# Patient Record
Sex: FEMALE | Race: BLACK OR AFRICAN AMERICAN | NOT HISPANIC OR LATINO
[De-identification: names, ages, dates, MRNs, and addresses within clinical notes are randomized per-mention and may not be internally consistent; named-entity substitution may affect disease eponyms.]

---

## 2018-03-09 ENCOUNTER — APPOINTMENT (OUTPATIENT)
Dept: PSYCHIATRY | Facility: CLINIC | Age: 33
End: 2018-03-09
Payer: OTHER GOVERNMENT

## 2018-03-16 ENCOUNTER — APPOINTMENT (OUTPATIENT)
Dept: PSYCHIATRY | Facility: CLINIC | Age: 33
End: 2018-03-16
Payer: OTHER GOVERNMENT

## 2018-03-16 PROCEDURE — 90791 PSYCH DIAGNOSTIC EVALUATION: CPT

## 2018-03-23 ENCOUNTER — APPOINTMENT (OUTPATIENT)
Dept: PSYCHIATRY | Facility: CLINIC | Age: 33
End: 2018-03-23
Payer: OTHER GOVERNMENT

## 2018-03-23 PROCEDURE — 90837 PSYTX W PT 60 MINUTES: CPT

## 2018-03-30 ENCOUNTER — APPOINTMENT (OUTPATIENT)
Dept: PSYCHIATRY | Facility: CLINIC | Age: 33
End: 2018-03-30

## 2018-04-20 ENCOUNTER — APPOINTMENT (OUTPATIENT)
Dept: PSYCHIATRY | Facility: CLINIC | Age: 33
End: 2018-04-20
Payer: OTHER GOVERNMENT

## 2018-04-20 PROCEDURE — 90834 PSYTX W PT 45 MINUTES: CPT

## 2018-05-04 ENCOUNTER — APPOINTMENT (OUTPATIENT)
Dept: PSYCHIATRY | Facility: CLINIC | Age: 33
End: 2018-05-04

## 2018-05-11 ENCOUNTER — APPOINTMENT (OUTPATIENT)
Dept: PSYCHIATRY | Facility: CLINIC | Age: 33
End: 2018-05-11
Payer: OTHER GOVERNMENT

## 2018-05-11 PROCEDURE — 90834 PSYTX W PT 45 MINUTES: CPT

## 2018-05-18 ENCOUNTER — APPOINTMENT (OUTPATIENT)
Dept: PSYCHIATRY | Facility: CLINIC | Age: 33
End: 2018-05-18
Payer: OTHER GOVERNMENT

## 2018-05-18 DIAGNOSIS — F43.21 ADJUSTMENT DISORDER WITH DEPRESSED MOOD: ICD-10-CM

## 2018-05-18 PROCEDURE — 90837 PSYTX W PT 60 MINUTES: CPT

## 2018-06-15 ENCOUNTER — APPOINTMENT (OUTPATIENT)
Dept: PSYCHIATRY | Facility: CLINIC | Age: 33
End: 2018-06-15

## 2018-08-15 ENCOUNTER — EMERGENCY (EMERGENCY)
Facility: HOSPITAL | Age: 33
LOS: 1 days | Discharge: HOME | End: 2018-08-15
Attending: EMERGENCY MEDICINE | Admitting: EMERGENCY MEDICINE
Payer: OTHER GOVERNMENT

## 2018-08-15 VITALS
SYSTOLIC BLOOD PRESSURE: 147 MMHG | OXYGEN SATURATION: 99 % | DIASTOLIC BLOOD PRESSURE: 99 MMHG | RESPIRATION RATE: 16 BRPM | HEART RATE: 82 BPM

## 2018-08-15 VITALS
TEMPERATURE: 99 F | DIASTOLIC BLOOD PRESSURE: 103 MMHG | SYSTOLIC BLOOD PRESSURE: 137 MMHG | RESPIRATION RATE: 17 BRPM | HEART RATE: 80 BPM | OXYGEN SATURATION: 100 %

## 2018-08-15 LAB
ALBUMIN SERPL ELPH-MCNC: 4 G/DL — SIGNIFICANT CHANGE UP (ref 3.3–5)
ALP SERPL-CCNC: 61 U/L — SIGNIFICANT CHANGE UP (ref 40–120)
ALT FLD-CCNC: 10 U/L — SIGNIFICANT CHANGE UP (ref 4–33)
AST SERPL-CCNC: 13 U/L — SIGNIFICANT CHANGE UP (ref 4–32)
BASOPHILS # BLD AUTO: 0.02 K/UL — SIGNIFICANT CHANGE UP (ref 0–0.2)
BASOPHILS NFR BLD AUTO: 0.3 % — SIGNIFICANT CHANGE UP (ref 0–2)
BILIRUB SERPL-MCNC: 0.3 MG/DL — SIGNIFICANT CHANGE UP (ref 0.2–1.2)
BUN SERPL-MCNC: 12 MG/DL — SIGNIFICANT CHANGE UP (ref 7–23)
CALCIUM SERPL-MCNC: 9.3 MG/DL — SIGNIFICANT CHANGE UP (ref 8.4–10.5)
CHLORIDE SERPL-SCNC: 101 MMOL/L — SIGNIFICANT CHANGE UP (ref 98–107)
CO2 SERPL-SCNC: 25 MMOL/L — SIGNIFICANT CHANGE UP (ref 22–31)
CREAT SERPL-MCNC: 0.93 MG/DL — SIGNIFICANT CHANGE UP (ref 0.5–1.3)
D DIMER BLD IA.RAPID-MCNC: < 150 NG/ML — SIGNIFICANT CHANGE UP
EOSINOPHIL # BLD AUTO: 0.07 K/UL — SIGNIFICANT CHANGE UP (ref 0–0.5)
EOSINOPHIL NFR BLD AUTO: 1.1 % — SIGNIFICANT CHANGE UP (ref 0–6)
GLUCOSE SERPL-MCNC: 102 MG/DL — HIGH (ref 70–99)
HCT VFR BLD CALC: 36.6 % — SIGNIFICANT CHANGE UP (ref 34.5–45)
HGB BLD-MCNC: 11.6 G/DL — SIGNIFICANT CHANGE UP (ref 11.5–15.5)
IMM GRANULOCYTES # BLD AUTO: 0.02 # — SIGNIFICANT CHANGE UP
IMM GRANULOCYTES NFR BLD AUTO: 0.3 % — SIGNIFICANT CHANGE UP (ref 0–1.5)
LYMPHOCYTES # BLD AUTO: 2.22 K/UL — SIGNIFICANT CHANGE UP (ref 1–3.3)
LYMPHOCYTES # BLD AUTO: 35.5 % — SIGNIFICANT CHANGE UP (ref 13–44)
MCHC RBC-ENTMCNC: 28 PG — SIGNIFICANT CHANGE UP (ref 27–34)
MCHC RBC-ENTMCNC: 31.7 % — LOW (ref 32–36)
MCV RBC AUTO: 88.4 FL — SIGNIFICANT CHANGE UP (ref 80–100)
MONOCYTES # BLD AUTO: 0.54 K/UL — SIGNIFICANT CHANGE UP (ref 0–0.9)
MONOCYTES NFR BLD AUTO: 8.6 % — SIGNIFICANT CHANGE UP (ref 2–14)
NEUTROPHILS # BLD AUTO: 3.39 K/UL — SIGNIFICANT CHANGE UP (ref 1.8–7.4)
NEUTROPHILS NFR BLD AUTO: 54.2 % — SIGNIFICANT CHANGE UP (ref 43–77)
NRBC # FLD: 0 — SIGNIFICANT CHANGE UP
PLATELET # BLD AUTO: 241 K/UL — SIGNIFICANT CHANGE UP (ref 150–400)
PMV BLD: 10.7 FL — SIGNIFICANT CHANGE UP (ref 7–13)
POTASSIUM SERPL-MCNC: 3.8 MMOL/L — SIGNIFICANT CHANGE UP (ref 3.5–5.3)
POTASSIUM SERPL-SCNC: 3.8 MMOL/L — SIGNIFICANT CHANGE UP (ref 3.5–5.3)
PROT SERPL-MCNC: 7.1 G/DL — SIGNIFICANT CHANGE UP (ref 6–8.3)
RBC # BLD: 4.14 M/UL — SIGNIFICANT CHANGE UP (ref 3.8–5.2)
RBC # FLD: 13.6 % — SIGNIFICANT CHANGE UP (ref 10.3–14.5)
SODIUM SERPL-SCNC: 138 MMOL/L — SIGNIFICANT CHANGE UP (ref 135–145)
TROPONIN T, HIGH SENSITIVITY: < 6 NG/L — SIGNIFICANT CHANGE UP (ref ?–14)
WBC # BLD: 6.26 K/UL — SIGNIFICANT CHANGE UP (ref 3.8–10.5)
WBC # FLD AUTO: 6.26 K/UL — SIGNIFICANT CHANGE UP (ref 3.8–10.5)

## 2018-08-15 RX ORDER — KETOROLAC TROMETHAMINE 30 MG/ML
15 SYRINGE (ML) INJECTION ONCE
Qty: 0 | Refills: 0 | Status: DISCONTINUED | OUTPATIENT
Start: 2018-08-15 | End: 2018-08-15

## 2018-08-15 RX ADMIN — Medication 15 MILLIGRAM(S): at 04:43

## 2018-08-15 RX ADMIN — Medication 15 MILLIGRAM(S): at 05:32

## 2018-08-15 NOTE — ED PROVIDER NOTE - OBJECTIVE STATEMENT
32y/o F with PMHx of HTN, Hypothyroidism, presents to the ED with mid-sternal chest pain and SOB tonight, preventing her from sleeping. Her symptoms worsen with lying down, improve with sitting up. Pt had a 14hr drive to [place] 1.5w ago, but took breaks. Denies fevers/chills, cough, recent URI, leg pain, leg swelling, PMHx of DVT or PE, any other complaints. NKDA.

## 2018-08-15 NOTE — ED ADULT NURSE REASSESSMENT NOTE - NS ED NURSE REASSESS COMMENT FT1
Pt did st" When ready to discharge will call my  for ." Pt did appear comfortable following torodol...noted to be asleep...conversation regarding bp with pt...pt st"I have htn and thyroid dx, my meds are in the car I usually take them in am before going to the gym."

## 2018-08-15 NOTE — ED ADULT TRIAGE NOTE - CHIEF COMPLAINT QUOTE
alert c/o SOB and chest pain x 2 days    drove back and forth to [place] 10 days ago  hx HTN and hypothyroid

## 2018-08-15 NOTE — ED ADULT NURSE NOTE - NSIMPLEMENTINTERV_GEN_ALL_ED
Implemented All Universal Safety Interventions:  [place] to call system. Call bell, personal items and telephone within reach. Instruct patient to call for assistance. Room bathroom lighting operational. Non-slip footwear when patient is off stretcher. Physically safe environment: no spills, clutter or unnecessary equipment. Stretcher in lowest position, wheels locked, appropriate side rails in place.

## 2018-08-15 NOTE — ED PROVIDER NOTE - MEDICAL DECISION MAKING DETAILS
34y/o F with chest pain and SOB. Will get dimer to r/o PE given recent travel. EKG normal. Will get 1 troponin, although story not consistent with ACS.

## 2018-08-23 ENCOUNTER — EMERGENCY (EMERGENCY)
Facility: HOSPITAL | Age: 33
LOS: 1 days | Discharge: HOME | End: 2018-08-23
Attending: EMERGENCY MEDICINE
Payer: OTHER GOVERNMENT

## 2018-08-23 VITALS
RESPIRATION RATE: 16 BRPM | DIASTOLIC BLOOD PRESSURE: 76 MMHG | SYSTOLIC BLOOD PRESSURE: 117 MMHG | OXYGEN SATURATION: 100 % | TEMPERATURE: 98 F | HEART RATE: 78 BPM

## 2018-08-23 VITALS
RESPIRATION RATE: 18 BRPM | HEART RATE: 71 BPM | TEMPERATURE: 98 F | HEIGHT: 66 IN | SYSTOLIC BLOOD PRESSURE: 133 MMHG | OXYGEN SATURATION: 98 % | WEIGHT: 210.1 LBS | DIASTOLIC BLOOD PRESSURE: 85 MMHG

## 2018-08-23 DIAGNOSIS — R10.31 RIGHT LOWER QUADRANT PAIN: ICD-10-CM

## 2018-08-23 PROBLEM — E03.9 HYPOTHYROIDISM, UNSPECIFIED: Chronic | Status: ACTIVE | Noted: 2018-08-21

## 2018-08-23 PROBLEM — I10 ESSENTIAL (PRIMARY) HYPERTENSION: Chronic | Status: ACTIVE | Noted: 2018-08-21

## 2018-08-23 LAB
ALBUMIN SERPL ELPH-MCNC: 4.1 G/DL — SIGNIFICANT CHANGE UP (ref 3.3–5)
ALP SERPL-CCNC: 44 U/L — SIGNIFICANT CHANGE UP (ref 40–120)
ALT FLD-CCNC: 13 U/L — SIGNIFICANT CHANGE UP (ref 10–45)
ANION GAP SERPL CALC-SCNC: 10 MMOL/L — SIGNIFICANT CHANGE UP (ref 5–17)
APPEARANCE UR: CLEAR — SIGNIFICANT CHANGE UP
APTT BLD: 27 SEC — LOW (ref 27.5–37.4)
AST SERPL-CCNC: 17 U/L — SIGNIFICANT CHANGE UP (ref 10–40)
BASOPHILS # BLD AUTO: 0.1 K/UL — SIGNIFICANT CHANGE UP (ref 0–0.2)
BASOPHILS NFR BLD AUTO: 1.3 % — SIGNIFICANT CHANGE UP (ref 0–2)
BILIRUB SERPL-MCNC: 0.6 MG/DL — SIGNIFICANT CHANGE UP (ref 0.2–1.2)
BILIRUB UR-MCNC: NEGATIVE — SIGNIFICANT CHANGE UP
BLD GP AB SCN SERPL QL: NEGATIVE — SIGNIFICANT CHANGE UP
BUN SERPL-MCNC: 13 MG/DL — SIGNIFICANT CHANGE UP (ref 7–23)
CALCIUM SERPL-MCNC: 9.3 MG/DL — SIGNIFICANT CHANGE UP (ref 8.4–10.5)
CHLORIDE SERPL-SCNC: 102 MMOL/L — SIGNIFICANT CHANGE UP (ref 96–108)
CO2 SERPL-SCNC: 23 MMOL/L — SIGNIFICANT CHANGE UP (ref 22–31)
COLOR SPEC: YELLOW — SIGNIFICANT CHANGE UP
CREAT SERPL-MCNC: 0.97 MG/DL — SIGNIFICANT CHANGE UP (ref 0.5–1.3)
DIFF PNL FLD: ABNORMAL
EOSINOPHIL # BLD AUTO: 0.1 K/UL — SIGNIFICANT CHANGE UP (ref 0–0.5)
EOSINOPHIL NFR BLD AUTO: 1.7 % — SIGNIFICANT CHANGE UP (ref 0–6)
EPI CELLS # UR: SIGNIFICANT CHANGE UP /HPF
GAS PNL BLDV: SIGNIFICANT CHANGE UP
GLUCOSE SERPL-MCNC: 89 MG/DL — SIGNIFICANT CHANGE UP (ref 70–99)
GLUCOSE UR QL: NEGATIVE — SIGNIFICANT CHANGE UP
HCG UR QL: NEGATIVE — SIGNIFICANT CHANGE UP
HCT VFR BLD CALC: 37.2 % — SIGNIFICANT CHANGE UP (ref 34.5–45)
HGB BLD-MCNC: 12.3 G/DL — SIGNIFICANT CHANGE UP (ref 11.5–15.5)
INR BLD: 1.08 RATIO — SIGNIFICANT CHANGE UP (ref 0.88–1.16)
KETONES UR-MCNC: NEGATIVE — SIGNIFICANT CHANGE UP
LEUKOCYTE ESTERASE UR-ACNC: ABNORMAL
LYMPHOCYTES # BLD AUTO: 2.2 K/UL — SIGNIFICANT CHANGE UP (ref 1–3.3)
LYMPHOCYTES # BLD AUTO: 37.5 % — SIGNIFICANT CHANGE UP (ref 13–44)
MCHC RBC-ENTMCNC: 28.8 PG — SIGNIFICANT CHANGE UP (ref 27–34)
MCHC RBC-ENTMCNC: 33.1 GM/DL — SIGNIFICANT CHANGE UP (ref 32–36)
MCV RBC AUTO: 87 FL — SIGNIFICANT CHANGE UP (ref 80–100)
MONOCYTES # BLD AUTO: 0.5 K/UL — SIGNIFICANT CHANGE UP (ref 0–0.9)
MONOCYTES NFR BLD AUTO: 7.9 % — SIGNIFICANT CHANGE UP (ref 2–14)
NEUTROPHILS # BLD AUTO: 3.1 K/UL — SIGNIFICANT CHANGE UP (ref 1.8–7.4)
NEUTROPHILS NFR BLD AUTO: 51.7 % — SIGNIFICANT CHANGE UP (ref 43–77)
NITRITE UR-MCNC: NEGATIVE — SIGNIFICANT CHANGE UP
PH UR: 5.5 — SIGNIFICANT CHANGE UP (ref 5–8)
PLATELET # BLD AUTO: 249 K/UL — SIGNIFICANT CHANGE UP (ref 150–400)
POTASSIUM SERPL-MCNC: 3.6 MMOL/L — SIGNIFICANT CHANGE UP (ref 3.5–5.3)
POTASSIUM SERPL-SCNC: 3.6 MMOL/L — SIGNIFICANT CHANGE UP (ref 3.5–5.3)
PROT SERPL-MCNC: 7.4 G/DL — SIGNIFICANT CHANGE UP (ref 6–8.3)
PROT UR-MCNC: 30 MG/DL
PROTHROM AB SERPL-ACNC: 11.7 SEC — SIGNIFICANT CHANGE UP (ref 9.8–12.7)
RBC # BLD: 4.28 M/UL — SIGNIFICANT CHANGE UP (ref 3.8–5.2)
RBC # FLD: 13.4 % — SIGNIFICANT CHANGE UP (ref 10.3–14.5)
RBC CASTS # UR COMP ASSIST: ABNORMAL /HPF (ref 0–2)
RH IG SCN BLD-IMP: POSITIVE — SIGNIFICANT CHANGE UP
SODIUM SERPL-SCNC: 135 MMOL/L — SIGNIFICANT CHANGE UP (ref 135–145)
SP GR SPEC: 1.03 — HIGH (ref 1.01–1.02)
UROBILINOGEN FLD QL: NEGATIVE — SIGNIFICANT CHANGE UP
WBC # BLD: 6 K/UL — SIGNIFICANT CHANGE UP (ref 3.8–10.5)
WBC # FLD AUTO: 6 K/UL — SIGNIFICANT CHANGE UP (ref 3.8–10.5)
WBC UR QL: SIGNIFICANT CHANGE UP /HPF (ref 0–5)

## 2018-08-23 PROCEDURE — 82803 BLOOD GASES ANY COMBINATION: CPT

## 2018-08-23 PROCEDURE — 74177 CT ABD & PELVIS W/CONTRAST: CPT

## 2018-08-23 PROCEDURE — 81001 URINALYSIS AUTO W/SCOPE: CPT

## 2018-08-23 PROCEDURE — 85014 HEMATOCRIT: CPT

## 2018-08-23 PROCEDURE — 85027 COMPLETE CBC AUTOMATED: CPT

## 2018-08-23 PROCEDURE — 82330 ASSAY OF CALCIUM: CPT

## 2018-08-23 PROCEDURE — 86901 BLOOD TYPING SEROLOGIC RH(D): CPT

## 2018-08-23 PROCEDURE — 99284 EMERGENCY DEPT VISIT MOD MDM: CPT | Mod: 25

## 2018-08-23 PROCEDURE — 86900 BLOOD TYPING SEROLOGIC ABO: CPT

## 2018-08-23 PROCEDURE — 86850 RBC ANTIBODY SCREEN: CPT

## 2018-08-23 PROCEDURE — 76830 TRANSVAGINAL US NON-OB: CPT

## 2018-08-23 PROCEDURE — 87086 URINE CULTURE/COLONY COUNT: CPT

## 2018-08-23 PROCEDURE — 96376 TX/PRO/DX INJ SAME DRUG ADON: CPT

## 2018-08-23 PROCEDURE — 96374 THER/PROPH/DIAG INJ IV PUSH: CPT | Mod: XU

## 2018-08-23 PROCEDURE — 83605 ASSAY OF LACTIC ACID: CPT

## 2018-08-23 PROCEDURE — 84295 ASSAY OF SERUM SODIUM: CPT

## 2018-08-23 PROCEDURE — 93975 VASCULAR STUDY: CPT | Mod: 26

## 2018-08-23 PROCEDURE — 96375 TX/PRO/DX INJ NEW DRUG ADDON: CPT

## 2018-08-23 PROCEDURE — 99283 EMERGENCY DEPT VISIT LOW MDM: CPT | Mod: GC

## 2018-08-23 PROCEDURE — 82947 ASSAY GLUCOSE BLOOD QUANT: CPT

## 2018-08-23 PROCEDURE — 93975 VASCULAR STUDY: CPT

## 2018-08-23 PROCEDURE — 99285 EMERGENCY DEPT VISIT HI MDM: CPT

## 2018-08-23 PROCEDURE — 74177 CT ABD & PELVIS W/CONTRAST: CPT | Mod: 26

## 2018-08-23 PROCEDURE — 85730 THROMBOPLASTIN TIME PARTIAL: CPT

## 2018-08-23 PROCEDURE — 85610 PROTHROMBIN TIME: CPT

## 2018-08-23 PROCEDURE — 80053 COMPREHEN METABOLIC PANEL: CPT

## 2018-08-23 PROCEDURE — 82435 ASSAY OF BLOOD CHLORIDE: CPT

## 2018-08-23 PROCEDURE — 81025 URINE PREGNANCY TEST: CPT

## 2018-08-23 PROCEDURE — 76830 TRANSVAGINAL US NON-OB: CPT | Mod: 26

## 2018-08-23 PROCEDURE — 84132 ASSAY OF SERUM POTASSIUM: CPT

## 2018-08-23 RX ORDER — KETOROLAC TROMETHAMINE 30 MG/ML
30 SYRINGE (ML) INJECTION ONCE
Qty: 0 | Refills: 0 | Status: DISCONTINUED | OUTPATIENT
Start: 2018-08-23 | End: 2018-08-23

## 2018-08-23 RX ORDER — SODIUM CHLORIDE 9 MG/ML
1000 INJECTION INTRAMUSCULAR; INTRAVENOUS; SUBCUTANEOUS ONCE
Qty: 0 | Refills: 0 | Status: COMPLETED | OUTPATIENT
Start: 2018-08-23 | End: 2018-08-23

## 2018-08-23 RX ORDER — MORPHINE SULFATE 50 MG/1
4 CAPSULE, EXTENDED RELEASE ORAL ONCE
Qty: 0 | Refills: 0 | Status: DISCONTINUED | OUTPATIENT
Start: 2018-08-23 | End: 2018-08-23

## 2018-08-23 RX ADMIN — MORPHINE SULFATE 4 MILLIGRAM(S): 50 CAPSULE, EXTENDED RELEASE ORAL at 10:46

## 2018-08-23 RX ADMIN — MORPHINE SULFATE 4 MILLIGRAM(S): 50 CAPSULE, EXTENDED RELEASE ORAL at 10:16

## 2018-08-23 RX ADMIN — SODIUM CHLORIDE 1000 MILLILITER(S): 9 INJECTION INTRAMUSCULAR; INTRAVENOUS; SUBCUTANEOUS at 10:46

## 2018-08-23 RX ADMIN — MORPHINE SULFATE 4 MILLIGRAM(S): 50 CAPSULE, EXTENDED RELEASE ORAL at 15:59

## 2018-08-23 RX ADMIN — Medication 30 MILLIGRAM(S): at 13:17

## 2018-08-23 RX ADMIN — Medication 30 MILLIGRAM(S): at 13:47

## 2018-08-23 RX ADMIN — MORPHINE SULFATE 4 MILLIGRAM(S): 50 CAPSULE, EXTENDED RELEASE ORAL at 16:30

## 2018-08-23 RX ADMIN — SODIUM CHLORIDE 1000 MILLILITER(S): 9 INJECTION INTRAMUSCULAR; INTRAVENOUS; SUBCUTANEOUS at 10:16

## 2018-08-23 NOTE — ED PROVIDER NOTE - PROGRESS NOTE DETAILS
ob called Spoke with Dr. Gallo Brito, radiology, discussed that physical exam finding of RLQ pain and right adnexal tenderness was appreciated, however, US and CT are reporting left sided hemorrhagic ovarian cyst   - ZR ob called case discussed Attd:  Received sign out from Dr Blank.  Patient now pain improved.  Imaging with L sided ovarian cyst but no evidence of RLQ/R pelvic pathology.  Evaluated by OBGYN attending, no emergent intervention indicated.  Will discharge with outpatient follow up.  Lawrence Carrero M.D.

## 2018-08-23 NOTE — CONSULT NOTE ADULT - SUBJECTIVE AND OBJECTIVE BOX
33y  Last Menstrual Period 2018 presents with increasing RLQ and RL flank pain. Known history of ovarian cyst. Pain first started several months ago, patient reports now this morning became sharp and increased in severity. On Tuesday saw her normal OB/GYN Dr. Kapoor. Yesterday presented to urgent care for the same pain. Dx with UTI and D/C'd on macrobid. This morning saw her  base OBGYN who told her she did not have a UTI and as a result patient did not start medication. Now presents to ED the same pain. Admits to some nausea. Denies any vomiting. Denies CP/SOB, F/C/S. Patient is having normal bowel movements. Denies any diarrhea.    OB/GYN HISTORY:   PAST MEDICAL & SURGICAL HISTORY:  Hypothyroid  Hypothyroidism  HTN (hypertension)  No significant past surgical history  s/p ESSURE placement in bilateral fallopian tubes    Allergies    Mushrooms (Hives; Urticaria)  No Known Drug Allergies    MEDICATIONS  (STANDING):  morphine  - Injectable 4 milliGRAM(s) IV Push Once    FAMILY HISTORY:  No pertinent family history in first degree relatives      REVIEW OF SYSTEMS:  CONSTITUTIONAL: No weakness, fevers or chills  EYES/ENT: No visual changes;  No vertigo or throat pain   NECK: No pain or stiffness  RESPIRATORY: No cough, wheezing, hemoptysis; No shortness of breath  CARDIOVASCULAR: No chest pain or palpitations  GASTROINTESTINAL: + abdominal pain, +nausea  GENITOURINARY: No dysuria, frequency or hematuria  NEUROLOGICAL: No numbness or weakness  SKIN: No itching, burning, rashes, or lesions   All other review of systems is negative unless indicated above.    Name of GYN Physician: Dr. Kapoor    Vital Signs Last 24 Hrs  T(C): 36.9 (23 Aug 2018 14:26), Max: 36.9 (23 Aug 2018 14:26)  T(F): 98.4 (23 Aug 2018 14:26), Max: 98.4 (23 Aug 2018 14:26)  HR: 78 (23 Aug 2018 14:26) (66 - 78)  BP: 117/76 (23 Aug 2018 14:26) (117/76 - 133/85)  BP(mean): --  RR: 16 (23 Aug 2018 14:26) (16 - 18)  SpO2: 100% (23 Aug 2018 14:26) (98% - 100%)    PHYSICAL EXAM:      Constitutional: alert and oriented x 3  Gastrointestinal: soft, mildly tender in RLQ, no rebound/guarding. no palpable masses  : Cervix closed, no CMT, right adnexal tenderness no palpable masses, no left adnexal tenderness. uterus 8 wk size, no palpable fibroids/masses  Extremities: NTBL    LABS:                        12.3   6.0   )-----------( 249      ( 23 Aug 2018 10:22 )             37.2         135  |  102  |  13  ----------------------------<  89  3.6   |  23  |  0.97    Ca    9.3      23 Aug 2018 10:22    TPro  7.4  /  Alb  4.1  /  TBili  0.6  /  DBili  x   /  AST  17  /  ALT  13  /  AlkPhos  44      PT/INR - ( 23 Aug 2018 11:46 )   PT: 11.7 sec;   INR: 1.08 ratio         PTT - ( 23 Aug 2018 11:46 )  PTT:27.0 sec  Urinalysis Basic - ( 23 Aug 2018 10:22 )    Color: Yellow / Appearance: Clear / S.030 / pH: x  Gluc: x / Ketone: Negative  / Bili: Negative / Urobili: Negative   Blood: x / Protein: 30 mg/dL / Nitrite: Negative   Leuk Esterase: Trace / RBC: 10-25 /HPF / WBC 11-25 /HPF   Sq Epi: x / Non Sq Epi: OCC /HPF / Bacteria: x    Blood Type: O Positive    RADIOLOGY & ADDITIONAL STUDIES:    EXAM:  CT ABDOMEN AND PELVIS OC IC                            PROCEDURE DATE:  2018            INTERPRETATION:  CLINICAL INFORMATION: Right lower quadrant and right   flank pain.    COMPARISON: Pelvic ultrasound 2018.    PROCEDURE:   CT of the Abdomen and Pelvis was performed with intravenous contrast.   Intravenous contrast: 90 ml Omnipaque 350. 10 ml discarded.  Oral contrast: positive contrast was administered.  Sagittal and coronal reformats were performed.    FINDINGS:    LOWER CHEST: The visualized lung bases are clear.        LIVER: No focal lesion.      BILE DUCTS: Normal caliber.  GALLBLADDER: Status post cholecystectomy.  SPLEEN: Unremarkable.  PANCREAS: Unremarkable.  ADRENALS: Unremarkable.  KIDNEYS/URETERS: No hydronephrosis.  No focal lesion.    BLADDER: Within normal limits.  REPRODUCTIVE ORGANS: The uterus is within normal limits. Essure device in   place bilaterally. Left ovarian hemorrhagic cyst as characterized on   recent pelvic ultrasound.     BOWEL: Normal in course and caliber without obstruction. Appendix is   normal.  PERITONEUM/RETROPERITONEUM: No pneumoperitoneum, ascites, or   lymphadenopathy.  VESSELS:  No evidence of aortic aneurysm.      BONES/SOFT TISSUES: Mild scarring in the anterior abdominal wall may be   from prior procedure.    IMPRESSION:  No acute abnormality in the abdomen and pelvis.        EXAM:  US TRANSVAGINAL                          EXAM:  US DPLX PELVIC                            PROCEDURE DATE:  2018            INTERPRETATION:  CLINICAL INFORMATION: Right lower quadrant pain    LMP: 2018    COMPARISON: None available.    TECHNIQUE:     Endovaginal pelvic sonogram as per order. Transabdominal pelvic sonogram   was also performed as part of our standard protocol. Color and Spectral   Doppler was performed.    FINDINGS:    Uterus: Anteverted, measuring 10.4 x 5.6 x 6 cm. A  section scar   is noted in the lower uterine segment.    Endometrium: Thickened, measuring 1.2 cm.     Right ovary: 3 x 1.7 x 1.5 cm. Within normal limits. Color and spectral   Doppler demonstrated arterial and venous flow.    Left ovary: Overall dimensions of 4.6 x 3.4 x 5.1 cm. Contains a complex   cyst measuring 4.2 x 3 x 4.4 cm with lacy internal echoes, consistent   with a hemorrhagic cyst. Color and spectral Doppler demonstrated arterial   and venous flow in the left ovarian parenchyma.    Fluid: Trace free fluid in the cul-de-sac.    IMPRESSION:    Complex 4.4 cm hemorrhagic left ovarian cyst.    Thickened endometrium measuring 1.2 cm.                    SHERON HENDRICKSON M.D., ATTENDING RADIOLOGIST  This document has been electronically signed. Aug 23 2018 11:12AM                            REJI ONEIL M.D. ATTENDING RADIOLOGIST  This document has been electronically signed. Aug 23 2018 12:42PM 33y  Last Menstrual Period 2018 presents with increasing RLQ and RL flank pain. Known history of ovarian cyst. Pain first started several months ago, patient reports now this morning became sharp and increased in severity. On Tuesday saw her normal OB/GYN Dr. Kapoor. Yesterday presented to urgent care for the same pain. Dx with UTI and D/C'd on macrobid. This morning saw her  base OBGYN who told her she did not have a UTI and as a result patient did not start medication. Now presents to ED the same pain. Admits to some nausea. Denies any vomiting. Denies CP/SOB, F/C/S. Patient is having normal bowel movements. Denies any diarrhea.    OB/GYN HISTORY:     -M, Shoulder dystocia, 34wks, preeclampsia  -, C/S, 36wks     chlamydia, treated  Known L ovarian hemorrhagic cyst    PAST MEDICAL & SURGICAL HISTORY:  Hypothyroid  HTN (hypertension)  C/Sx1 ()  D&C x2  LSC cholecystectomy ()  Abdominoplasty (  s/p ESSURE placement in bilateral fallopian tubes ()    Allergies    Mushrooms (Hives; Urticaria)  No Known Drug Allergies    MEDICATIONS  (STANDING):  morphine  - Injectable 4 milliGRAM(s) IV Push Once    (Home)  Liothyronin 5mcg  synthroid 75mcg  losartan HCTZ  Fe during menses    FAMILY HISTORY:  No pertinent family history in first degree relatives      REVIEW OF SYSTEMS:  CONSTITUTIONAL: No weakness, fevers or chills  EYES/ENT: No visual changes;  No vertigo or throat pain   NECK: No pain or stiffness  RESPIRATORY: No cough, wheezing, hemoptysis; No shortness of breath  CARDIOVASCULAR: No chest pain or palpitations  GASTROINTESTINAL: + abdominal pain, +nausea  GENITOURINARY: No dysuria, frequency or hematuria  NEUROLOGICAL: No numbness or weakness  SKIN: No itching, burning, rashes, or lesions   All other review of systems is negative unless indicated above.    Name of GYN Physician: Dr. Kapoor    Vital Signs Last 24 Hrs  T(C): 36.9 (23 Aug 2018 14:26), Max: 36.9 (23 Aug 2018 14:26)  T(F): 98.4 (23 Aug 2018 14:26), Max: 98.4 (23 Aug 2018 14:26)  HR: 78 (23 Aug 2018 14:26) (66 - 78)  BP: 117/76 (23 Aug 2018 14:26) (117/76 - 133/85)  BP(mean): --  RR: 16 (23 Aug 2018 14:26) (16 - 18)  SpO2: 100% (23 Aug 2018 14:26) (98% - 100%)    PHYSICAL EXAM:      Constitutional: alert and oriented x 3  Gastrointestinal: soft, mildly tender in RLQ, no rebound/guarding. no palpable masses  : Cervix closed, no CMT, right adnexal tenderness no palpable masses, no left adnexal tenderness. uterus 8 wk size, no palpable fibroids/masses  Extremities: NTBL    LABS:                        12.3   6.0   )-----------( 249      ( 23 Aug 2018 10:22 )             37.2         135  |  102  |  13  ----------------------------<  89  3.6   |  23  |  0.97    Ca    9.3      23 Aug 2018 10:22    TPro  7.4  /  Alb  4.1  /  TBili  0.6  /  DBili  x   /  AST  17  /  ALT  13  /  AlkPhos  44      PT/INR - ( 23 Aug 2018 11:46 )   PT: 11.7 sec;   INR: 1.08 ratio         PTT - ( 23 Aug 2018 11:46 )  PTT:27.0 sec  Urinalysis Basic - ( 23 Aug 2018 10:22 )    Color: Yellow / Appearance: Clear / S.030 / pH: x  Gluc: x / Ketone: Negative  / Bili: Negative / Urobili: Negative   Blood: x / Protein: 30 mg/dL / Nitrite: Negative   Leuk Esterase: Trace / RBC: 10-25 /HPF / WBC 11-25 /HPF   Sq Epi: x / Non Sq Epi: OCC /HPF / Bacteria: x    Blood Type: O Positive    RADIOLOGY & ADDITIONAL STUDIES:    EXAM:  CT ABDOMEN AND PELVIS OC IC                            PROCEDURE DATE:  2018            INTERPRETATION:  CLINICAL INFORMATION: Right lower quadrant and right   flank pain.    COMPARISON: Pelvic ultrasound 2018.    PROCEDURE:   CT of the Abdomen and Pelvis was performed with intravenous contrast.   Intravenous contrast: 90 ml Omnipaque 350. 10 ml discarded.  Oral contrast: positive contrast was administered.  Sagittal and coronal reformats were performed.    FINDINGS:    LOWER CHEST: The visualized lung bases are clear.        LIVER: No focal lesion.      BILE DUCTS: Normal caliber.  GALLBLADDER: Status post cholecystectomy.  SPLEEN: Unremarkable.  PANCREAS: Unremarkable.  ADRENALS: Unremarkable.  KIDNEYS/URETERS: No hydronephrosis.  No focal lesion.    BLADDER: Within normal limits.  REPRODUCTIVE ORGANS: The uterus is within normal limits. Essure device in   place bilaterally. Left ovarian hemorrhagic cyst as characterized on   recent pelvic ultrasound.     BOWEL: Normal in course and caliber without obstruction. Appendix is   normal.  PERITONEUM/RETROPERITONEUM: No pneumoperitoneum, ascites, or   lymphadenopathy.  VESSELS:  No evidence of aortic aneurysm.      BONES/SOFT TISSUES: Mild scarring in the anterior abdominal wall may be   from prior procedure.    IMPRESSION:  No acute abnormality in the abdomen and pelvis.        EXAM:  US TRANSVAGINAL                          EXAM:  US DPLX PELVIC                            PROCEDURE DATE:  2018            INTERPRETATION:  CLINICAL INFORMATION: Right lower quadrant pain    LMP: 2018    COMPARISON: None available.    TECHNIQUE:     Endovaginal pelvic sonogram as per order. Transabdominal pelvic sonogram   was also performed as part of our standard protocol. Color and Spectral   Doppler was performed.    FINDINGS:    Uterus: Anteverted, measuring 10.4 x 5.6 x 6 cm. A  section scar   is noted in the lower uterine segment.    Endometrium: Thickened, measuring 1.2 cm.     Right ovary: 3 x 1.7 x 1.5 cm. Within normal limits. Color and spectral   Doppler demonstrated arterial and venous flow.    Left ovary: Overall dimensions of 4.6 x 3.4 x 5.1 cm. Contains a complex   cyst measuring 4.2 x 3 x 4.4 cm with lacy internal echoes, consistent   with a hemorrhagic cyst. Color and spectral Doppler demonstrated arterial   and venous flow in the left ovarian parenchyma.    Fluid: Trace free fluid in the cul-de-sac.    IMPRESSION:    Complex 4.4 cm hemorrhagic left ovarian cyst.    Thickened endometrium measuring 1.2 cm.                    SHERON HENDRICKSON M.D., ATTENDING RADIOLOGIST  This document has been electronically signed. Aug 23 2018 11:12AM                            REJI ONEIL M.D. ATTENDING RADIOLOGIST  This document has been electronically signed. Aug 23 2018 12:42PM

## 2018-08-23 NOTE — ED PROVIDER NOTE - OBJECTIVE STATEMENT
32 yo F w/ pmhx hypothyroidism, HTN, c/o of 1 month of RLQ pain, radiating to the right lower back, worsening in severity over the past few days. Pt reports intermitted nausea, vomiting but not now. Pt saw GYN on Tuesday, US was remarkable for right sided ovarian cyst, unsure of the size. Now, pt is reporting urinary frequency and burning on urination as of yesterday, saw another doctor today, and given macrobid, but did not start meds yet. denies any other symptoms.

## 2018-08-23 NOTE — CONSULT NOTE ADULT - ASSESSMENT
33y  [facility] 2018 who presents with worsening RLQ pain with TVUS demonstrating a left hemorrhagic cyst.

## 2018-08-23 NOTE — CONSULT NOTE ADULT - PROBLEM SELECTOR RECOMMENDATION 9
- Unlikely GYN related as there is no right adnexal mass and minimal free fluid on TVUS and CTAP. Normal right ovary with flow and no signs of torsion.  - Patient has no pain on left adnexa.  - Pain may be from UTI. Would recommend empiric treatment and then follow up urine culture

## 2018-08-23 NOTE — PROGRESS NOTE ADULT - SUBJECTIVE AND OBJECTIVE BOX
HPI    33y    Last Menstrual Period 2018 presents with increasing  RLQ and RL flank pain. Known history First started several months ago, patient reports now this morning became sharp and increased in severity. On Tuesday saw her normal OB/GYN Dr. Kapoor. Yesterday presented to urgent care for same pain. Dx with UTI and D/C'd on macrobid. This morning saw her  base OBGYN who told her she did not have a UTI and as a result patient did not start medication. Now presents to ED    OB/GYN HISTORY:   PAST MEDICAL & SURGICAL HISTORY:  Hypothyroid  Hypothyroidism  HTN (hypertension)  No significant past surgical history  No significant past surgical history    Allergies    Mushrooms (Hives; Urticaria)  No Known Drug Allergies    Intolerances      MEDICATIONS  (STANDING):  morphine  - Injectable 4 milliGRAM(s) IV Push Once    MEDICATIONS  (PRN):    FAMILY HISTORY:  No pertinent family history in first degree relatives    SOCIAL HISTORY:  REVIEW OF SYSTEMS:    CONSTITUTIONAL: No weakness, fevers or chills  EYES/ENT: No visual changes;  No vertigo or throat pain   NECK: No pain or stiffness  RESPIRATORY: No cough, wheezing, hemoptysis; No shortness of breath  CARDIOVASCULAR: No chest pain or palpitations  GASTROINTESTINAL: No abdominal or epigastric pain. No nausea, vomiting, or hematemesis; No diarrhea or constipation. No melena or hematochezia.  GENITOURINARY: No dysuria, frequency or hematuria  NEUROLOGICAL: No numbness or weakness  SKIN: No itching, burning, rashes, or lesions   All other review of systems is negative unless indicated above.    Name of GYN Physician:  Date of Last Pap:  History of Abnormal Pap:  Date of Last Mammogram:  Date of Last Colonoscopy:    Vital Signs Last 24 Hrs  T(C): 36.9 (23 Aug 2018 14:26), Max: 36.9 (23 Aug 2018 14:26)  T(F): 98.4 (23 Aug 2018 14:26), Max: 98.4 (23 Aug 2018 14:26)  HR: 78 (23 Aug 2018 14:26) (66 - 78)  BP: 117/76 (23 Aug 2018 14:26) (117/76 - 133/85)  BP(mean): --  RR: 16 (23 Aug 2018 14:26) (16 - 18)  SpO2: 100% (23 Aug 2018 14:26) (98% - 100%)    PHYSICAL EXAM:      Constitutional: alert and oriented x 3    Breasts: no tenderness, no nodules    Respiratory: clear    Cardiovascular: regular rate and rhythm    Gastrointestinal: soft, non tender, + bowel sounds. No hepatosplenomegaly, no palpable masses    Genitourinary: NEFG  Cervix: closed/ long, no CMT  Uterus: normal size, non tender  Adnexa: non tender, no palpable masses    Rectal:     Extremities:    Neurological:    Skin:    Lymph Nodes:        LABS:                        12.3   6.0   )-----------( 249      ( 23 Aug 2018 10:22 )             37.2     08-    135  |  102  |  13  ----------------------------<  89  3.6   |  23  |  0.97    Ca    9.3      23 Aug 2018 10:22    TPro  7.4  /  Alb  4.1  /  TBili  0.6  /  DBili  x   /  AST  17  /  ALT  13  /  AlkPhos  44      PT/INR - ( 23 Aug 2018 11:46 )   PT: 11.7 sec;   INR: 1.08 ratio         PTT - ( 23 Aug 2018 11:46 )  PTT:27.0 sec  Urinalysis Basic - ( 23 Aug 2018 10:22 )    Color: Yellow / Appearance: Clear / S.030 / pH: x  Gluc: x / Ketone: Negative  / Bili: Negative / Urobili: Negative   Blood: x / Protein: 30 mg/dL / Nitrite: Negative   Leuk Esterase: Trace / RBC: 10-25 /HPF / WBC 11-25 /HPF   Sq Epi: x / Non Sq Epi: OCC /HPF / Bacteria: x        Blood Type: O Positive      RADIOLOGY & ADDITIONAL STUDIES:

## 2018-08-23 NOTE — ED ADULT NURSE NOTE - CHIEF COMPLAINT QUOTE
right flank pain for a month radiating to right lower quadrant, with burning on urination for since yesterday, denies blood in urin. recently diagnosed with right ovarian cyst.

## 2018-08-23 NOTE — ED ADULT NURSE NOTE - NSIMPLEMENTINTERV_GEN_ALL_ED
Implemented All Fall Risk Interventions:  [place] to call system. Call bell, personal items and telephone within reach. Instruct patient to call for assistance. Room bathroom lighting operational. Non-slip footwear when patient is off stretcher. Physically safe environment: no spills, clutter or unnecessary equipment. Stretcher in lowest position, wheels locked, appropriate side rails in place. Provide visual cue, wrist band, yellow gown, etc. Monitor gait and stability. Monitor for mental status changes and reorient to person, place, and time. Review medications for side effects contributing to fall risk. Reinforce activity limits and safety measures with patient and family.

## 2018-08-23 NOTE — ED ADULT NURSE NOTE - OBJECTIVE STATEMENT
34 yo F w/ pmhx hypothyroidism, HTN, c/o  of RLQ pain for several months , radiating to the right lower back, worsening in severity over the past few days. Pt reports intermitted nausea, vomiting but not now. Pt saw GYN on Tuesday, US was remarkable for right sided ovarian cyst, unsure of the size. Now, pt is reporting urinary frequency and burning on urination as of yesterday, saw another doctor today, and given Macrobid, but did not start meds yet. denies any other symptoms.

## 2018-08-23 NOTE — ED ADULT NURSE REASSESSMENT NOTE - NS ED NURSE REASSESS COMMENT FT1
As per MD Blank, pt. ok to go to US at this time.
pt continues to experience pain in the RLQ abd and right flank area, however report pain is improved . Will reassess
pt is awaiting gyn consult.

## 2018-08-23 NOTE — ED PROVIDER NOTE - MEDICAL DECISION MAKING DETAILS
34 yo F w/ pmhx hypothyroidism, HTN, c/o of 1 month of RLQ pain, radiating to the right lower back, worsening in severity over the past few days. Was diagnosed with right ovarian Cyst 2 days ago, and UTI yesterday, with obtain US to r/o gynecological pathology and CT to r/o appy  - ZR 32 yo F w/ pmhx hypothyroidism, HTN, c/o of 1 month of RLQ pain, radiating to the right lower back, worsening in severity over the past few days. Was diagnosed with right ovarian Cyst 2 days ago, and UTI yesterday,  by her GYN ,Se[facility] at [facility] for continue severe rt low q pain and send to ED for evaluation ,no fever or chills has significant rt low q tenderness and rt adnexa tenderness ,will obtain CT scan and ultrasounds /pain meds and on reevaluation:  ZR 32 yo F with lmp july 29 ,on bcp  pmhx hypothyroidism, HTN, c/o of 1 month of RLQ pain, radiating to the right lower back, worsening in severity over the past few days. Was diagnosed with right ovarian Cyst 2 days ago, and UTI yesterday,  by her GYN ,Seen [facility] at [facility] for continue severe rt low q pain and send to ED for evaluation ,no fever or chills has significant rt low q tenderness and rt adnexa tenderness ,will obtain CT scan and ultrasounds /pain meds and on reevaluation:  REYNALDO 32 yo F with lmp july 29 , has essure iud.  pmhx hypothyroidism, HTN, c/o of 1 month of RLQ pain, radiating to the right lower back, worsening in severity over the past few days. Was diagnosed with right ovarian Cyst 2 days ago, and UTI yesterday,  by her GYN ,[facility] for continue severe rt low q pain and send to ED for evaluation ,no fever or chills has significant rt low q tenderness and rt adnexa tenderness ,will obtain CT scan and ultrasounds /pain meds and on reevaluation:  ZR

## 2018-08-23 NOTE — CONSULT NOTE ADULT - ATTENDING COMMENTS
examined patient after evaluating radiologic results. left hemorrhagic ovarian cyst 4.4cm with RLQ pain. pain noted by patient to be localized to the area of her "tummy tuck" on the right. neither rebound nor guarding. patient states her pain is deeper but patient reaction doesn't support that. She also has sharp pain in the area of the right parasternal muscles below the area of the CVA. She believes the pain is going from front to back but her back pain is very direct within the muscles and not consistent with radiating pain. Other than the ovarian cyst on the opposite side, gyn neg. Consider possibility of developing herpes zoster with pain in a dermatome with no lesions yet. Too early to tell. Had diarrhea yesterday.  Nilton SCHRADER

## 2018-08-24 LAB
CULTURE RESULTS: NO GROWTH — SIGNIFICANT CHANGE UP
SPECIMEN SOURCE: SIGNIFICANT CHANGE UP

## 2018-09-21 ENCOUNTER — OUTPATIENT (OUTPATIENT)
Dept: OUTPATIENT SERVICES | Facility: HOSPITAL | Age: 33
LOS: 1 days | End: 2018-09-21
Payer: OTHER GOVERNMENT

## 2018-09-21 DIAGNOSIS — R94.31 ABNORMAL ELECTROCARDIOGRAM [ECG] [EKG]: ICD-10-CM

## 2018-09-21 PROCEDURE — 93010 ELECTROCARDIOGRAM REPORT: CPT

## 2018-09-21 PROCEDURE — 93005 ELECTROCARDIOGRAM TRACING: CPT

## 2018-09-26 ENCOUNTER — APPOINTMENT (OUTPATIENT)
Dept: OBGYN | Facility: CLINIC | Age: 33
End: 2018-09-26
Payer: OTHER GOVERNMENT

## 2018-09-26 PROCEDURE — 99202 OFFICE O/P NEW SF 15 MIN: CPT

## 2018-10-27 ENCOUNTER — TRANSCRIPTION ENCOUNTER (OUTPATIENT)
Age: 33
End: 2018-10-27

## 2018-11-15 ENCOUNTER — OUTPATIENT (OUTPATIENT)
Dept: OUTPATIENT SERVICES | Facility: HOSPITAL | Age: 33
LOS: 1 days | End: 2018-11-15
Payer: COMMERCIAL

## 2018-11-15 VITALS
HEART RATE: 68 BPM | OXYGEN SATURATION: 98 % | TEMPERATURE: 99 F | HEIGHT: 66 IN | DIASTOLIC BLOOD PRESSURE: 87 MMHG | WEIGHT: 220.02 LBS | RESPIRATION RATE: 13 BRPM | SYSTOLIC BLOOD PRESSURE: 130 MMHG

## 2018-11-15 DIAGNOSIS — Z30.8 ENCOUNTER FOR OTHER CONTRACEPTIVE MANAGEMENT: Chronic | ICD-10-CM

## 2018-11-15 DIAGNOSIS — R10.2 PELVIC AND PERINEAL PAIN: ICD-10-CM

## 2018-11-15 DIAGNOSIS — Z90.49 ACQUIRED ABSENCE OF OTHER SPECIFIED PARTS OF DIGESTIVE TRACT: Chronic | ICD-10-CM

## 2018-11-15 DIAGNOSIS — Z01.818 ENCOUNTER FOR OTHER PREPROCEDURAL EXAMINATION: ICD-10-CM

## 2018-11-15 DIAGNOSIS — I10 ESSENTIAL (PRIMARY) HYPERTENSION: ICD-10-CM

## 2018-11-15 DIAGNOSIS — Z98.891 HISTORY OF UTERINE SCAR FROM PREVIOUS SURGERY: Chronic | ICD-10-CM

## 2018-11-15 DIAGNOSIS — Z98.890 OTHER SPECIFIED POSTPROCEDURAL STATES: Chronic | ICD-10-CM

## 2018-11-15 DIAGNOSIS — E03.9 HYPOTHYROIDISM, UNSPECIFIED: ICD-10-CM

## 2018-11-15 LAB
ANION GAP SERPL CALC-SCNC: 16 MMOL/L — SIGNIFICANT CHANGE UP (ref 5–17)
BUN SERPL-MCNC: 15 MG/DL — SIGNIFICANT CHANGE UP (ref 7–23)
CALCIUM SERPL-MCNC: 9.6 MG/DL — SIGNIFICANT CHANGE UP (ref 8.4–10.5)
CHLORIDE SERPL-SCNC: 104 MMOL/L — SIGNIFICANT CHANGE UP (ref 96–108)
CO2 SERPL-SCNC: 21 MMOL/L — LOW (ref 22–31)
CREAT SERPL-MCNC: 0.85 MG/DL — SIGNIFICANT CHANGE UP (ref 0.5–1.3)
GLUCOSE SERPL-MCNC: 91 MG/DL — SIGNIFICANT CHANGE UP (ref 70–99)
HCT VFR BLD CALC: 36.7 % — SIGNIFICANT CHANGE UP (ref 34.5–45)
HGB BLD-MCNC: 12 G/DL — SIGNIFICANT CHANGE UP (ref 11.5–15.5)
MCHC RBC-ENTMCNC: 28.8 PG — SIGNIFICANT CHANGE UP (ref 27–34)
MCHC RBC-ENTMCNC: 32.7 GM/DL — SIGNIFICANT CHANGE UP (ref 32–36)
MCV RBC AUTO: 88.2 FL — SIGNIFICANT CHANGE UP (ref 80–100)
PLATELET # BLD AUTO: 260 K/UL — SIGNIFICANT CHANGE UP (ref 150–400)
POTASSIUM SERPL-MCNC: 3.6 MMOL/L — SIGNIFICANT CHANGE UP (ref 3.5–5.3)
POTASSIUM SERPL-SCNC: 3.6 MMOL/L — SIGNIFICANT CHANGE UP (ref 3.5–5.3)
RBC # BLD: 4.16 M/UL — SIGNIFICANT CHANGE UP (ref 3.8–5.2)
RBC # FLD: 14.7 % — HIGH (ref 10.3–14.5)
SODIUM SERPL-SCNC: 141 MMOL/L — SIGNIFICANT CHANGE UP (ref 135–145)
WBC # BLD: 7.49 K/UL — SIGNIFICANT CHANGE UP (ref 3.8–10.5)
WBC # FLD AUTO: 7.49 K/UL — SIGNIFICANT CHANGE UP (ref 3.8–10.5)

## 2018-11-15 PROCEDURE — 85027 COMPLETE CBC AUTOMATED: CPT

## 2018-11-15 PROCEDURE — 80048 BASIC METABOLIC PNL TOTAL CA: CPT

## 2018-11-15 PROCEDURE — G0463: CPT

## 2018-11-15 RX ORDER — CEFOTETAN DISODIUM 1 G
2 VIAL (EA) INJECTION ONCE
Qty: 0 | Refills: 0 | Status: DISCONTINUED | OUTPATIENT
Start: 2018-11-29 | End: 2018-12-14

## 2018-11-15 NOTE — H&P PST ADULT - PSH
Encounter for post Essure sterilization check  2016, removal scheduled 18  H/O abdominoplasty    History of  delivery    History of laparoscopic cholecystectomy

## 2018-11-15 NOTE — H&P PST ADULT - DENTITION
s/p filling in right bottom tooth one month ago, dentist stated she may need root canal, has some discomfort when eating or with extreme heat/cold.

## 2018-11-15 NOTE — H&P PST ADULT - ATTENDING COMMENTS
Pt consented for Laparoscopic bilateral salpingectomy, bilateral essure removals, poss xlap, D+C, dx hysteroscopy. All questions answered.

## 2018-11-15 NOTE — H&P PST ADULT - NEGATIVE NEUROLOGICAL SYMPTOMS
no tremors/no vertigo/no syncope/no weakness/no generalized seizures/no focal seizures/no paresthesias

## 2018-11-15 NOTE — H&P PST ADULT - HISTORY OF PRESENT ILLNESS
Mrs. Hart is 33 year old obese woman with PMH HTN and Hypothyroidism who has Essure birth control in place with c/o pelvic and perineal pain scheduled for removal of devices and bilateral fallopian tubes.  She c/o constant pelvic/perneal pain up to 8/10.

## 2018-11-15 NOTE — H&P PST ADULT - ALLERGY TYPES
reactions to food/outdoor environmental allergies/mushrooms  "itchy throat"/indoor environmental allergies/reactions to animals

## 2018-11-15 NOTE — H&P PST ADULT - PROBLEM SELECTOR PLAN 1
Scheduled for Laparoscopic bilateral salpingectomy, removal of essure devices, possible exploratory laparotomy.  POCT, urine for pregnancy  Preop instructions given, will have gyn piercing changed to plastic.

## 2018-11-16 PROBLEM — E03.9 HYPOTHYROIDISM, UNSPECIFIED: Chronic | Status: INACTIVE | Noted: 2018-08-23 | Resolved: 2018-11-15

## 2018-11-28 ENCOUNTER — TRANSCRIPTION ENCOUNTER (OUTPATIENT)
Age: 33
End: 2018-11-28

## 2018-11-29 ENCOUNTER — APPOINTMENT (OUTPATIENT)
Dept: OBGYN | Facility: HOSPITAL | Age: 33
End: 2018-11-29

## 2018-11-29 ENCOUNTER — RESULT REVIEW (OUTPATIENT)
Age: 33
End: 2018-11-29

## 2018-11-29 ENCOUNTER — OUTPATIENT (OUTPATIENT)
Dept: OUTPATIENT SERVICES | Facility: HOSPITAL | Age: 33
LOS: 1 days | End: 2018-11-29
Payer: COMMERCIAL

## 2018-11-29 VITALS
DIASTOLIC BLOOD PRESSURE: 84 MMHG | WEIGHT: 220.02 LBS | HEART RATE: 79 BPM | HEIGHT: 66 IN | TEMPERATURE: 99 F | SYSTOLIC BLOOD PRESSURE: 129 MMHG | RESPIRATION RATE: 18 BRPM | OXYGEN SATURATION: 97 %

## 2018-11-29 VITALS
HEART RATE: 75 BPM | RESPIRATION RATE: 16 BRPM | TEMPERATURE: 98 F | DIASTOLIC BLOOD PRESSURE: 70 MMHG | OXYGEN SATURATION: 100 % | SYSTOLIC BLOOD PRESSURE: 113 MMHG

## 2018-11-29 DIAGNOSIS — Z30.8 ENCOUNTER FOR OTHER CONTRACEPTIVE MANAGEMENT: Chronic | ICD-10-CM

## 2018-11-29 DIAGNOSIS — R10.2 PELVIC AND PERINEAL PAIN: ICD-10-CM

## 2018-11-29 DIAGNOSIS — Z98.890 OTHER SPECIFIED POSTPROCEDURAL STATES: Chronic | ICD-10-CM

## 2018-11-29 DIAGNOSIS — Z90.49 ACQUIRED ABSENCE OF OTHER SPECIFIED PARTS OF DIGESTIVE TRACT: Chronic | ICD-10-CM

## 2018-11-29 DIAGNOSIS — Z98.891 HISTORY OF UTERINE SCAR FROM PREVIOUS SURGERY: Chronic | ICD-10-CM

## 2018-11-29 PROBLEM — E66.9 OBESITY, UNSPECIFIED: Chronic | Status: ACTIVE | Noted: 2018-11-15

## 2018-11-29 LAB
BLD GP AB SCN SERPL QL: NEGATIVE — SIGNIFICANT CHANGE UP
HCG UR QL: NEGATIVE — SIGNIFICANT CHANGE UP
RH IG SCN BLD-IMP: POSITIVE — SIGNIFICANT CHANGE UP

## 2018-11-29 PROCEDURE — 58555 HYSTEROSCOPY DX SEP PROC: CPT

## 2018-11-29 PROCEDURE — 58661 LAPAROSCOPY REMOVE ADNEXA: CPT

## 2018-11-29 PROCEDURE — 58555 HYSTEROSCOPY DX SEP PROC: CPT | Mod: 80

## 2018-11-29 PROCEDURE — 86900 BLOOD TYPING SEROLOGIC ABO: CPT

## 2018-11-29 PROCEDURE — 58661 LAPAROSCOPY REMOVE ADNEXA: CPT | Mod: 80

## 2018-11-29 PROCEDURE — 86850 RBC ANTIBODY SCREEN: CPT

## 2018-11-29 PROCEDURE — 86901 BLOOD TYPING SEROLOGIC RH(D): CPT

## 2018-11-29 PROCEDURE — 88302 TISSUE EXAM BY PATHOLOGIST: CPT | Mod: 26

## 2018-11-29 PROCEDURE — 81025 URINE PREGNANCY TEST: CPT

## 2018-11-29 PROCEDURE — 88302 TISSUE EXAM BY PATHOLOGIST: CPT

## 2018-11-29 RX ORDER — ONDANSETRON 8 MG/1
4 TABLET, FILM COATED ORAL EVERY 8 HOURS
Qty: 0 | Refills: 0 | Status: DISCONTINUED | OUTPATIENT
Start: 2018-11-29 | End: 2018-11-29

## 2018-11-29 RX ORDER — OXYCODONE HYDROCHLORIDE 5 MG/1
1 TABLET ORAL
Qty: 15 | Refills: 0 | OUTPATIENT
Start: 2018-11-29

## 2018-11-29 RX ORDER — ACETAMINOPHEN 500 MG
1000 TABLET ORAL ONCE
Qty: 0 | Refills: 0 | Status: COMPLETED | OUTPATIENT
Start: 2018-11-29 | End: 2018-11-29

## 2018-11-29 RX ORDER — LEVOTHYROXINE SODIUM 125 MCG
1 TABLET ORAL
Qty: 0 | Refills: 0 | COMMUNITY

## 2018-11-29 RX ORDER — ACETAMINOPHEN 500 MG
3 TABLET ORAL
Qty: 0 | Refills: 0 | COMMUNITY

## 2018-11-29 RX ORDER — SODIUM CHLORIDE 9 MG/ML
3 INJECTION INTRAMUSCULAR; INTRAVENOUS; SUBCUTANEOUS EVERY 8 HOURS
Qty: 0 | Refills: 0 | Status: DISCONTINUED | OUTPATIENT
Start: 2018-11-29 | End: 2018-11-29

## 2018-11-29 RX ORDER — LIDOCAINE HCL 20 MG/ML
0.2 VIAL (ML) INJECTION ONCE
Qty: 0 | Refills: 0 | Status: DISCONTINUED | OUTPATIENT
Start: 2018-11-29 | End: 2018-11-29

## 2018-11-29 RX ORDER — LIOTHYRONINE SODIUM 25 UG/1
1 TABLET ORAL
Qty: 0 | Refills: 0 | COMMUNITY

## 2018-11-29 RX ORDER — LOSARTAN/HYDROCHLOROTHIAZIDE 100MG-25MG
1 TABLET ORAL
Qty: 0 | Refills: 0 | COMMUNITY

## 2018-11-29 RX ORDER — IBUPROFEN 200 MG
3 TABLET ORAL
Qty: 0 | Refills: 0 | COMMUNITY

## 2018-11-29 RX ORDER — HYDROMORPHONE HYDROCHLORIDE 2 MG/ML
0.5 INJECTION INTRAMUSCULAR; INTRAVENOUS; SUBCUTANEOUS
Qty: 0 | Refills: 0 | Status: DISCONTINUED | OUTPATIENT
Start: 2018-11-29 | End: 2018-11-29

## 2018-11-29 RX ORDER — SODIUM CHLORIDE 9 MG/ML
1000 INJECTION, SOLUTION INTRAVENOUS
Qty: 0 | Refills: 0 | Status: DISCONTINUED | OUTPATIENT
Start: 2018-11-29 | End: 2018-11-29

## 2018-11-29 RX ORDER — SODIUM CHLORIDE 9 MG/ML
1000 INJECTION, SOLUTION INTRAVENOUS
Qty: 0 | Refills: 0 | Status: DISCONTINUED | OUTPATIENT
Start: 2018-11-29 | End: 2018-12-14

## 2018-11-29 RX ADMIN — Medication 1000 MILLIGRAM(S): at 19:20

## 2018-11-29 RX ADMIN — HYDROMORPHONE HYDROCHLORIDE 0.5 MILLIGRAM(S): 2 INJECTION INTRAMUSCULAR; INTRAVENOUS; SUBCUTANEOUS at 19:14

## 2018-11-29 RX ADMIN — HYDROMORPHONE HYDROCHLORIDE 0.5 MILLIGRAM(S): 2 INJECTION INTRAMUSCULAR; INTRAVENOUS; SUBCUTANEOUS at 19:15

## 2018-11-29 RX ADMIN — SODIUM CHLORIDE 125 MILLILITER(S): 9 INJECTION, SOLUTION INTRAVENOUS at 19:16

## 2018-11-29 RX ADMIN — Medication 400 MILLIGRAM(S): at 19:20

## 2018-11-29 NOTE — BRIEF OPERATIVE NOTE - PROCEDURE
<<-----Click on this checkbox to enter Procedure Diagnostic hysteroscopy  11/29/2018    Active  [email or web address]  Laparoscopic bilateral salpingectomy  11/29/2018  and removal of essure device  Active  [email or web address]

## 2018-11-29 NOTE — ASU DISCHARGE PLAN (ADULT/PEDIATRIC). - NOTIFY
Swelling that continues/Increased Irritability or Sluggishness/Pain not relieved by Medications/Unable to Urinate/Numbness, tingling/Inability to Tolerate Liquids or Foods/Numbness, color, or temperature change to extremity/Bleeding that does not stop/GYN Fever>100.4/Excessive Diarrhea/Persistent Nausea and Vomiting

## 2018-11-29 NOTE — BRIEF OPERATIVE NOTE - OPERATION/FINDINGS
grossly normal axial uterus of approximately 6 weeks size, grossly normal tubes and ovaries bilaterally, Essure device in both fallopian tubes

## 2018-11-29 NOTE — ASU DISCHARGE PLAN (ADULT/PEDIATRIC). - FOR NEXT 12 HOURS DO NOT:
Pt can not get another neb machine for 5 years per Medicare guidelines. Pt notified by company    Statement Selected

## 2018-11-30 NOTE — ASU DISCHARGE PLAN (ADULT/PEDIATRIC). - POST OP PHONE #
Detail Level: Detailed
Instructions (Optional): Patient is certain that this lesion has been present her entire life without any change. She will continue to monitor and return with any changes or new onset of symptoms (such as pain, itching or bleeding). Patient declines biopsy at this site (she is aware that melanoma can mimic blue nevus)
Size Of Lesion: 5 x 5 mm
[id number] / [id number]

## 2018-12-04 LAB — SURGICAL PATHOLOGY STUDY: SIGNIFICANT CHANGE UP

## 2018-12-07 ENCOUNTER — EMERGENCY (EMERGENCY)
Facility: HOSPITAL | Age: 33
LOS: 1 days | Discharge: HOME | End: 2018-12-07
Attending: EMERGENCY MEDICINE
Payer: OTHER GOVERNMENT

## 2018-12-07 VITALS
HEART RATE: 86 BPM | HEIGHT: 66 IN | DIASTOLIC BLOOD PRESSURE: 94 MMHG | SYSTOLIC BLOOD PRESSURE: 145 MMHG | WEIGHT: 220.02 LBS | RESPIRATION RATE: 18 BRPM | TEMPERATURE: 99 F | OXYGEN SATURATION: 98 %

## 2018-12-07 DIAGNOSIS — Z98.890 OTHER SPECIFIED POSTPROCEDURAL STATES: Chronic | ICD-10-CM

## 2018-12-07 DIAGNOSIS — Z90.49 ACQUIRED ABSENCE OF OTHER SPECIFIED PARTS OF DIGESTIVE TRACT: Chronic | ICD-10-CM

## 2018-12-07 DIAGNOSIS — Z98.891 HISTORY OF UTERINE SCAR FROM PREVIOUS SURGERY: Chronic | ICD-10-CM

## 2018-12-07 DIAGNOSIS — Z30.8 ENCOUNTER FOR OTHER CONTRACEPTIVE MANAGEMENT: Chronic | ICD-10-CM

## 2018-12-07 PROCEDURE — 99284 EMERGENCY DEPT VISIT MOD MDM: CPT

## 2018-12-07 RX ORDER — ACETAMINOPHEN 500 MG
650 TABLET ORAL ONCE
Qty: 0 | Refills: 0 | Status: COMPLETED | OUTPATIENT
Start: 2018-12-07 | End: 2018-12-07

## 2018-12-07 NOTE — ED PROVIDER NOTE - MEDICAL DECISION MAKING DETAILS
33f 8 days s/p laparoscopic bilateral salpingectomy and Essure removal here for pain at site of port, also subj fever. NAD, w soft abd, reddened port site w scant purulence, no surrounding erythema. Possible subq collection, deep intraabd abscess less likely. Labs, ua, cxr, pain ctrl, obgyn c/s, reassess

## 2018-12-07 NOTE — ED PROVIDER NOTE - ATTENDING CONTRIBUTION TO CARE
32 y/o female with the above documented history and HPI who on exam appears well and comfortable. VSs noted, sclerae anicteric, MM's moist, neck supple, breathing c/ ease, abdomen soft, ND c/ what appears to be a small area of wound dehiscence at inferior aspect of umbilicus and local ttp c/ light yellow serous drainage s/ surrounding erythema, ecchymosis or warmth, extremities s/ asymmetry, skin s/ jaundice and neurologically intact. GYN consulted for post-op wound eval. Nothing clinically evident to suggest any alternative or additional process. Their plan will be ours.

## 2018-12-07 NOTE — ED PROVIDER NOTE - OBJECTIVE STATEMENT
33f 8 days s/p laparoscopic bilateral salpingectomy and Essure removal here c/o pain at and drainage from surg site. Also w subjective fever and fatigue at home. Denies n/v. No abd pain other than at site of port. Has been taking motrin w some relief. No dysuria, no sob though has been coughing.

## 2018-12-07 NOTE — ED ADULT NURSE NOTE - OBJECTIVE STATEMENT
33 y.o F presents to the ED from home c/o surgical incision redness. Patient is awake, alert and speaking coherently. Patient states "I had surgery 1 week ago to remove my fallopian tubes but I noticed today the incision was red and had puss coming from it." Patient reports low grade fevers intermittently throughout the day and took Motrin at 1600; patient also reports headache and lethargy. Patient presents A&ox3, afebrile and ambulatory; denies chest pain and SOB, lungs clear; abdomen soft, nontender and nondistended, denies n/v- has some diarrhea, denies blood in stool and denies hematuria and dysuria. Small surgical incision in umbilicus can be seen- no sutures- area red with some crust and small amount of yellow puss. Patient not on antibiotics.

## 2018-12-07 NOTE — ED PROVIDER NOTE - NSFOLLOWUPINSTRUCTIONS_ED_ALL_ED_FT
You were seen in the emergency department for redness at your surgical site. Please follow up with your obgyn in the next 3-5 days. Take Keflex 500 milligrams 4 times a day for the next 7 days. Return to the emergency department immediately if you experience fever, worsening abdominal pain, vomiting or any other concerning symptoms.

## 2018-12-07 NOTE — ED PROVIDER NOTE - PROGRESS NOTE DETAILS
Elizabeth Goldberger PGY-2: paged ob as pt recently post-op here, awaiting call back Elizabeth Goldberger PGY-2: seen by ob, who agree infxn appears superficial. Recommend treating  cellulitis w keflex, will follow pt in office. Pt feeling well, stable for dc

## 2018-12-07 NOTE — ED PROVIDER NOTE - CARE PLAN
Principal Discharge DX:	Cellulitis  Secondary Diagnosis:	Infection of superficial incisional surgical site after procedure, initial encounter

## 2018-12-07 NOTE — ED PROVIDER NOTE - ABDOMINAL EXAM
reddened, mildly scaly appearing skin below umbilicus w scant purulence visible, no surrounding erythema, no fluctuance; tender at site but abd otherwise soft NTND/soft/nontender.../nondistended

## 2018-12-07 NOTE — ED PROVIDER NOTE - SKIN TEMP
COPD ROS: taking medications as instructed, no medication side effects noted, no significant ongoing wheezing or shortness of breath, using bronchodilator MDI less than twice a week.   Immunisations current  Meds: BREO, Spiriva respimat, Proair HFA.  FEV1 1.21( 30%), has improved 109%  No exacabations  Discourage vaping  New concerns: Chr GARCIA, mRC grade 1-2.     Exam: appears well, vitals normal, no respiratory distress, acyanotic, normal RR, chest clear, no wheezing, crepitations, rhonchi, normal symmetric air entry.     Assessment: COPD reasonably well controlled.     Plan: current treatment plan is effective, no change in therapy.     ZACH is 4, Estimated 52 month mortality in COPD 60%   warm

## 2018-12-07 NOTE — ED ADULT NURSE NOTE - NSIMPLEMENTINTERV_GEN_ALL_ED
Implemented All Universal Safety Interventions:  [place] to call system. Call bell, personal items and telephone within reach. Instruct patient to call for assistance. Room bathroom lighting operational. Non-slip footwear when patient is off stretcher. Physically safe environment: no spills, clutter or unnecessary equipment. Stretcher in lowest position, wheels locked, appropriate side rails in place.

## 2018-12-08 VITALS
SYSTOLIC BLOOD PRESSURE: 126 MMHG | HEART RATE: 78 BPM | OXYGEN SATURATION: 100 % | RESPIRATION RATE: 16 BRPM | DIASTOLIC BLOOD PRESSURE: 91 MMHG

## 2018-12-08 DIAGNOSIS — L03.90 CELLULITIS, UNSPECIFIED: ICD-10-CM

## 2018-12-08 LAB
ALBUMIN SERPL ELPH-MCNC: 4.2 G/DL — SIGNIFICANT CHANGE UP (ref 3.3–5)
ALP SERPL-CCNC: 68 U/L — SIGNIFICANT CHANGE UP (ref 40–120)
ALT FLD-CCNC: 15 U/L — SIGNIFICANT CHANGE UP (ref 10–45)
ANION GAP SERPL CALC-SCNC: 12 MMOL/L — SIGNIFICANT CHANGE UP (ref 5–17)
APPEARANCE UR: CLEAR — SIGNIFICANT CHANGE UP
AST SERPL-CCNC: 14 U/L — SIGNIFICANT CHANGE UP (ref 10–40)
BACTERIA # UR AUTO: NEGATIVE — SIGNIFICANT CHANGE UP
BASOPHILS # BLD AUTO: 0 K/UL — SIGNIFICANT CHANGE UP (ref 0–0.2)
BASOPHILS NFR BLD AUTO: 0.5 % — SIGNIFICANT CHANGE UP (ref 0–2)
BILIRUB SERPL-MCNC: 0.3 MG/DL — SIGNIFICANT CHANGE UP (ref 0.2–1.2)
BILIRUB UR-MCNC: NEGATIVE — SIGNIFICANT CHANGE UP
BUN SERPL-MCNC: 18 MG/DL — SIGNIFICANT CHANGE UP (ref 7–23)
CALCIUM SERPL-MCNC: 9.8 MG/DL — SIGNIFICANT CHANGE UP (ref 8.4–10.5)
CHLORIDE SERPL-SCNC: 100 MMOL/L — SIGNIFICANT CHANGE UP (ref 96–108)
CO2 SERPL-SCNC: 24 MMOL/L — SIGNIFICANT CHANGE UP (ref 22–31)
COLOR SPEC: YELLOW — SIGNIFICANT CHANGE UP
CREAT SERPL-MCNC: 0.87 MG/DL — SIGNIFICANT CHANGE UP (ref 0.5–1.3)
DIFF PNL FLD: NEGATIVE — SIGNIFICANT CHANGE UP
EOSINOPHIL # BLD AUTO: 0.2 K/UL — SIGNIFICANT CHANGE UP (ref 0–0.5)
EOSINOPHIL NFR BLD AUTO: 2.3 % — SIGNIFICANT CHANGE UP (ref 0–6)
EPI CELLS # UR: 8 /HPF — HIGH
GLUCOSE SERPL-MCNC: 108 MG/DL — HIGH (ref 70–99)
GLUCOSE UR QL: NEGATIVE — SIGNIFICANT CHANGE UP
HCT VFR BLD CALC: 36.7 % — SIGNIFICANT CHANGE UP (ref 34.5–45)
HGB BLD-MCNC: 12.6 G/DL — SIGNIFICANT CHANGE UP (ref 11.5–15.5)
HYALINE CASTS # UR AUTO: 0 /LPF — SIGNIFICANT CHANGE UP (ref 0–2)
KETONES UR-MCNC: NEGATIVE — SIGNIFICANT CHANGE UP
LEUKOCYTE ESTERASE UR-ACNC: ABNORMAL
LYMPHOCYTES # BLD AUTO: 2.4 K/UL — SIGNIFICANT CHANGE UP (ref 1–3.3)
LYMPHOCYTES # BLD AUTO: 30.5 % — SIGNIFICANT CHANGE UP (ref 13–44)
MCHC RBC-ENTMCNC: 29.6 PG — SIGNIFICANT CHANGE UP (ref 27–34)
MCHC RBC-ENTMCNC: 34.3 GM/DL — SIGNIFICANT CHANGE UP (ref 32–36)
MCV RBC AUTO: 86.2 FL — SIGNIFICANT CHANGE UP (ref 80–100)
MONOCYTES # BLD AUTO: 0.7 K/UL — SIGNIFICANT CHANGE UP (ref 0–0.9)
MONOCYTES NFR BLD AUTO: 8.7 % — SIGNIFICANT CHANGE UP (ref 2–14)
NEUTROPHILS # BLD AUTO: 4.6 K/UL — SIGNIFICANT CHANGE UP (ref 1.8–7.4)
NEUTROPHILS NFR BLD AUTO: 58.1 % — SIGNIFICANT CHANGE UP (ref 43–77)
NITRITE UR-MCNC: NEGATIVE — SIGNIFICANT CHANGE UP
PH UR: 5.5 — SIGNIFICANT CHANGE UP (ref 5–8)
PLATELET # BLD AUTO: 256 K/UL — SIGNIFICANT CHANGE UP (ref 150–400)
POTASSIUM SERPL-MCNC: 3.5 MMOL/L — SIGNIFICANT CHANGE UP (ref 3.5–5.3)
POTASSIUM SERPL-SCNC: 3.5 MMOL/L — SIGNIFICANT CHANGE UP (ref 3.5–5.3)
PROT SERPL-MCNC: 7.7 G/DL — SIGNIFICANT CHANGE UP (ref 6–8.3)
PROT UR-MCNC: ABNORMAL
RBC # BLD: 4.26 M/UL — SIGNIFICANT CHANGE UP (ref 3.8–5.2)
RBC # FLD: 14.7 % — HIGH (ref 10.3–14.5)
RBC CASTS # UR COMP ASSIST: 5 /HPF — HIGH (ref 0–4)
SODIUM SERPL-SCNC: 136 MMOL/L — SIGNIFICANT CHANGE UP (ref 135–145)
SP GR SPEC: 1.03 — HIGH (ref 1.01–1.02)
UROBILINOGEN FLD QL: NEGATIVE — SIGNIFICANT CHANGE UP
WBC # BLD: 7.9 K/UL — SIGNIFICANT CHANGE UP (ref 3.8–10.5)
WBC # FLD AUTO: 7.9 K/UL — SIGNIFICANT CHANGE UP (ref 3.8–10.5)
WBC UR QL: 33 /HPF — HIGH (ref 0–5)

## 2018-12-08 PROCEDURE — 71046 X-RAY EXAM CHEST 2 VIEWS: CPT

## 2018-12-08 PROCEDURE — 99284 EMERGENCY DEPT VISIT MOD MDM: CPT | Mod: 25

## 2018-12-08 PROCEDURE — 85027 COMPLETE CBC AUTOMATED: CPT

## 2018-12-08 PROCEDURE — 80053 COMPREHEN METABOLIC PANEL: CPT

## 2018-12-08 PROCEDURE — 71046 X-RAY EXAM CHEST 2 VIEWS: CPT | Mod: 26

## 2018-12-08 PROCEDURE — 81001 URINALYSIS AUTO W/SCOPE: CPT

## 2018-12-08 PROCEDURE — 87086 URINE CULTURE/COLONY COUNT: CPT

## 2018-12-08 RX ORDER — CEPHALEXIN 500 MG
500 CAPSULE ORAL ONCE
Qty: 0 | Refills: 0 | Status: COMPLETED | OUTPATIENT
Start: 2018-12-08 | End: 2018-12-08

## 2018-12-08 RX ORDER — CEPHALEXIN 500 MG
1 CAPSULE ORAL
Qty: 28 | Refills: 0 | OUTPATIENT
Start: 2018-12-08 | End: 2018-12-14

## 2018-12-08 RX ADMIN — Medication 500 MILLIGRAM(S): at 02:33

## 2018-12-08 RX ADMIN — Medication 650 MILLIGRAM(S): at 01:00

## 2018-12-08 RX ADMIN — Medication 650 MILLIGRAM(S): at 00:01

## 2018-12-08 NOTE — CONSULT NOTE ADULT - SUBJECTIVE AND OBJECTIVE BOX
HPI:  33  status post lsc bilateral salpingectomy and essure removal POD#9 presents to ED with complaint of pain and discharge at umbilical surgical site. Pt notes increased pain and reddness at site x3d associated with fatigue and chills. Denies fever, cp/sob, dizziness/lightheadedness, nausea/vomiting, dysuria, vaginal discharge/bleeding. Denies sick contacts, cough, URI symptoms. +Diarrhea x1 episode.       OB/GYN HISTORY:   , , C/S, etopx2  H/o irregular menses 2/2 essure. Denies h/o STI, fibroids endometriosis.     PAST MEDICAL & SURGICAL HISTORY:  Obesity, Class II, BMI 35-39.9, with comorbidity  Hypothyroidism  HTN (hypertension)  Encounter for post Essure sterilization check: , removal scheduled 18  H/O abdominoplasty:   History of laparoscopic cholecystectomy:   History of  delivery:     Allergies    Mushrooms (Hives; Urticaria)  No Known Drug Allergies    Intolerances      MEDICATIONS  (STANDING):   · 	ibuprofen 200 mg oral tablet: 3 tab(s) orally every 6 hours  · 	Synthroid 75 mcg (0.075 mg) oral tablet: 1 tab(s) orally once a day  · 	liothyronine 5 mcg oral tablet: 1 tab(s) orally once a day  · 	losartan-hydrochlorothiazide 50mg-12.5mg oral tablet: 1 tab(s) orally once a day    FAMILY HISTORY:  No pertinent family history in first degree relatives    SOCIAL HISTORY: denies etoh abuse/drugs/tobacco    Name of GYN Physician: Dr. Leung  Date of Last Pap: 2018 , nml per pt  History of Abnormal Pap: denies     Vital Signs Last 24 Hrs  T(C): 37.1 (07 Dec 2018 23:09), Max: 37.1 (07 Dec 2018 20:37)  T(F): 98.8 (07 Dec 2018 23:09), Max: 98.8 (07 Dec 2018 23:09)  HR: 78 (07 Dec 2018 23:09) (78 - 86)  BP: 136/88 (07 Dec 2018 23:09) (136/88 - 145/94)  BP(mean): --  RR: 16 (07 Dec 2018 23:09) (16 - 18)  SpO2: 100% (07 Dec 2018 23:09) (98% - 100%)    PHYSICAL EXAM:      Constitutional: alert and oriented x 3    Respiratory: clear to ascultation bilaterally   Cardiovascular: regular rate and rhythm, no murmur  Gastrointestinal: soft, non tender, non-distended,  no rebound/guarding, + bowel sounds. No organomegaly, no palpable masses  Incision: umbilical port incision with erythema, warmth, serous discharge. 2x1cm area of induration at site and below site. No opening through which discharge maybe expressed.   Pelvic: No bleeding   Rectal: deferred  Extremities: Non-tender bilaterally, No edema  Neurological: Grossly intact      LABS:                        12.6   7.9   )-----------( 256      ( 07 Dec 2018 23:44 )             36.7         136  |  100  |  18  ----------------------------<  108<H>  3.5   |  24  |  0.87    Ca    9.8      07 Dec 2018 23:44    TPro  7.7  /  Alb  4.2  /  TBili  0.3  /  DBili  x   /  AST  14  /  ALT  15  /  AlkPhos  68  12      Urinalysis Basic - ( 07 Dec 2018 23:44 )    Color: Yellow / Appearance: Clear / S.034 / pH: x  Gluc: x / Ketone: Negative  / Bili: Negative / Urobili: Negative   Blood: x / Protein: Trace / Nitrite: Negative   Leuk Esterase: Large / RBC: 5 /hpf / WBC 33 /hpf   Sq Epi: x / Non Sq Epi: 8 /hpf / Bacteria: Negative        Blood Type: O Positive      RADIOLOGY & ADDITIONAL STUDIES:

## 2018-12-08 NOTE — CONSULT NOTE ADULT - PROBLEM SELECTOR RECOMMENDATION 9
-Recommend Keflex 500mg four time per day for seven days   -Pt to follow up as scheduled in GYN office on Monday  -Please return to ED if develop fever/chills, purulent discharge from site, or severe pain at site   -C/w Motrin 600mg up to every 6h for pain relief prn     d/w Dr. Frank bernardo PGY2

## 2018-12-08 NOTE — CONSULT NOTE ADULT - ASSESSMENT
A/P: 33 status post lsc bilateral salpingectomy and removal of essure device with umbilical surgical site infection. Given site non-purulent cellulitis with absence of systemic symptoms and normal WBC 7.9, recommend treatment with PO antibiotics with Follow up on Monday as scheduled

## 2018-12-09 LAB
CULTURE RESULTS: NO GROWTH — SIGNIFICANT CHANGE UP
CULTURE RESULTS: SIGNIFICANT CHANGE UP
SPECIMEN SOURCE: SIGNIFICANT CHANGE UP

## 2018-12-10 ENCOUNTER — APPOINTMENT (OUTPATIENT)
Dept: OBGYN | Facility: CLINIC | Age: 33
End: 2018-12-10
Payer: OTHER GOVERNMENT

## 2018-12-10 PROCEDURE — 99024 POSTOP FOLLOW-UP VISIT: CPT

## 2018-12-19 ENCOUNTER — APPOINTMENT (OUTPATIENT)
Dept: OBGYN | Facility: CLINIC | Age: 33
End: 2018-12-19
Payer: OTHER GOVERNMENT

## 2018-12-19 PROCEDURE — 99024 POSTOP FOLLOW-UP VISIT: CPT

## 2018-12-29 NOTE — ED PROVIDER NOTE - CROS ED CARDIOVAS ALL NEG
POing well. Voided. Pain controlled on PO medications. Pain when up and moving. RN reinforced that patient will have pain for a few days after surgery. Reviewed AVS, medications, and follow up instructions with patient and parents. All questions answered. Discharged at 1730   - - -

## 2019-01-16 ENCOUNTER — APPOINTMENT (OUTPATIENT)
Dept: OBGYN | Facility: CLINIC | Age: 34
End: 2019-01-16
Payer: OTHER GOVERNMENT

## 2019-01-16 ENCOUNTER — APPOINTMENT (OUTPATIENT)
Dept: OBGYN | Facility: CLINIC | Age: 34
End: 2019-01-16

## 2019-01-16 PROCEDURE — 99213 OFFICE O/P EST LOW 20 MIN: CPT

## 2023-09-20 NOTE — ED ADULT TRIAGE NOTE - CHIEF COMPLAINT QUOTE
right flank pain for a month radiating to right lower quadrant, with burning on urination for since yesterday, denies blood in urin. recently diagnosed with right ovarian cyst. Detail Level: Zone Detail Level: Detailed

## 2025-03-30 NOTE — ED PROVIDER NOTE - RESPIRATORY [-], MLM
Pt with elevated BPs s/p Procardia 60. Pt to receive additional Procardia 30 now and begin Procardia 90 tomorrow. Will collect HELLP labs now.     Per Dr. Brian Valdovinos, PGY1 no cough